# Patient Record
(demographics unavailable — no encounter records)

---

## 2024-10-10 NOTE — IMAGING
[de-identified] : RIGHT HAND skin intact. nodule over IF dorsal ulnar DIPJ. mild swelling of IF ulnar PIPJ. TTP to IF dorsal ulnar DIPJ nodule > ulnar PIPJ. good EPL, FPL. other fingers good extension, flex to full fist. good finger abduction, adduction. SILT to median, ulnar, radial distribution. palpable radial pulse, brisk cap refill all digits. no triggering.   XRAYS OF RIGHT INDEX FINGER (3 views - PA, OBLIQUE, AND LATERAL VIEWS): no acute displaced fracture or dislocation. calcifications ulnar to DIPJ and PIPJ.

## 2024-10-10 NOTE — HISTORY OF PRESENT ILLNESS
[de-identified] : 10/10/24: 56yo female (RHD) presents for RIGHT index finger mass after an injury ~6 months ago. She was lifting a laundry machine, and her finger got jammed underneath the detergent drawer. Currently on antibiotics for oral infection. [FreeTextEntry5] : GENNA olguin [RHD] 55 year old female is here today c/o RIGHT index finger x 6 months after finger was jammed against a drawer. reports bump at tip of finger is increasing in size. denies prior injury to finger.  -on antibiotics for oral infection.

## 2024-10-10 NOTE — IMAGING
[de-identified] : RIGHT HAND skin intact. nodule over IF dorsal ulnar DIPJ. mild swelling of IF ulnar PIPJ. TTP to IF dorsal ulnar DIPJ nodule > ulnar PIPJ. good EPL, FPL. other fingers good extension, flex to full fist. good finger abduction, adduction. SILT to median, ulnar, radial distribution. palpable radial pulse, brisk cap refill all digits. no triggering.   XRAYS OF RIGHT INDEX FINGER (3 views - PA, OBLIQUE, AND LATERAL VIEWS): no acute displaced fracture or dislocation. calcifications ulnar to DIPJ and PIPJ.

## 2024-10-10 NOTE — HISTORY OF PRESENT ILLNESS
[de-identified] : 10/10/24: 56yo female (RHD) presents for RIGHT index finger mass after an injury ~6 months ago. She was lifting a laundry machine, and her finger got jammed underneath the detergent drawer. Currently on antibiotics for oral infection. [FreeTextEntry5] : GENNA olguin [RHD] 55 year old female is here today c/o RIGHT index finger x 6 months after finger was jammed against a drawer. reports bump at tip of finger is increasing in size. denies prior injury to finger.  -on antibiotics for oral infection.

## 2024-10-10 NOTE — ASSESSMENT
[FreeTextEntry1] : The condition was explained to the patient. Calcifications likely due to arthritis, may also be related to injury.  Discussed that arthritis is a progressive degenerative process, and symptoms may have a waxing/waning course, which may be exacerbated by activity or trauma.  Discussed increased propensity for stiffness due to underlying arthritis.  - recommend silicone sleeve for finger PRN pain. - recommend Voltaren gel 3x/day for 1-2 weeks. reviewed contra-indicated medical conditions (eg liver disease, kidney disease, or GI ulcer/bleeding) or medications (eg blood thinners).  - activity as tolerated.   F/u PRN.

## 2025-02-25 NOTE — SIGNATURES
[TextEntry] : This note was written by Nickie Jiang on 02/25/2025 actively solely BLAKE Nunez M.D 02/25/2025. All medical record entries made by this scribe were at my  BLAKE Nunez M.D  direction and personally dictated by me on 02/25/2025. I have personally reviewed my chart and agree that the record reflects my personal performance of the history, physical exam, assessment, and plan.

## 2025-02-25 NOTE — HISTORY OF PRESENT ILLNESS
[FreeTextEntry1] : 56 yo P2 LMP 2019 presents for annual gyn exam.   Pt is doing well and has no complaints. She denies PMB and abnormal discharge. Offers no complaints regarding bowel movement or urination.   [Mammogramdate] : 02/23 [BreastSonogramDate] : 02/23 [PapSmeardate] : 02/23

## 2025-02-25 NOTE — PLAN
[FreeTextEntry1] : 54 yo P2 LMP 2019 presents for annual gyn exam.   Cervical polyp removed during PE today, tissue biopy sent Pap/HPV done today RTO for annual

## 2025-02-25 NOTE — PHYSICAL EXAM
[Chaperone Present] : A chaperone was present in the examining room during all aspects of the physical examination [FreeTextEntry2] : Omid [Appropriately responsive] : appropriately responsive [Alert] : alert [No Acute Distress] : no acute distress [No Lymphadenopathy] : no lymphadenopathy [Soft] : soft [Non-tender] : non-tender [Non-distended] : non-distended [No HSM] : No HSM [No Lesions] : no lesions [No Mass] : no mass [Oriented x3] : oriented x3 [Examination Of The Breasts] : a normal appearance [No Masses] : no breast masses were palpable [Labia Majora] : normal [Labia Minora] : normal [Polyp ___ cm] : [unfilled] ~[facility] polyp [Tissue Removed] : tissue removed [Normal] : normal [Uterine Adnexae] : normal

## 2025-04-28 NOTE — IMAGING
[de-identified] : RIGHT HAND superficial laceration over IF nail fold. nodule over IF dorsal ulnar DIPJ with mild surrounding swelling. no erythema or drainage. no TTP. mild swelling of IF PIPJ. good EPL, FPL. other fingers good extension, flex to full fist. good finger abduction, adduction. SILT to median, ulnar, radial distribution. palpable radial pulse, brisk cap refill all digits. no triggering.  @10/10/24 XRAYS OF RIGHT INDEX FINGER (3 views - PA, OBLIQUE, AND LATERAL VIEWS): no acute displaced fracture or dislocation. calcifications ulnar to DIPJ and PIPJ.

## 2025-04-28 NOTE — HISTORY OF PRESENT ILLNESS
[de-identified] : 4/28/25: f/u RIGHT index finger mass. reports mass fluctuates in size. reports that she often picks at it, which may exacerbate it. not painful, but "sore and annoying." Reports that she tried Voltaren gel without improvement. Reports that she saw a Plastic surgeon, who recommended surgical excision of cyst. Reports that she saw a Hand / Total body surgeon in [place], who recommended aspiration, but she deferred at that time because he is out of network.  10/10/24: 56yo female (RHD) presents for RIGHT index finger mass after an injury ~6 months ago. She was lifting a laundry machine, and her finger got jammed underneath the detergent drawer. Currently on antibiotics for oral infection. [FreeTextEntry5] : GENNA is here today to follow up on her RIGHT index finger. pt states there are no changes in their symptoms since the last visit.

## 2025-04-28 NOTE — ASSESSMENT
[FreeTextEntry1] : Based on today's exam, mass appears to be primarily due to calcifications, which may be due to prior injury or arthritis. There does not appear to be enough fluid for aspiration.  We had an extensive discussion regarding the risks and benefits of treatment options - observation, silicone sleeve for finger, Voltaren gel PRN pain, aspiration if there is sufficient fluid, or surgical excision.  All of patient's questions were answered. Patient expressed understanding. - Patient is not interested in surgery. - Recommend silicone sleeve for finger and Voltaren gel PRN. - activity as tolerated.   F/u PRN.

## 2025-04-28 NOTE — HISTORY OF PRESENT ILLNESS
[de-identified] : 4/28/25: f/u RIGHT index finger mass. reports mass fluctuates in size. reports that she often picks at it, which may exacerbate it. not painful, but "sore and annoying." Reports that she tried Voltaren gel without improvement. Reports that she saw a Plastic surgeon, who recommended surgical excision of cyst. Reports that she saw a Hand / Total body surgeon in [place], who recommended aspiration, but she deferred at that time because he is out of network.  10/10/24: 54yo female (RHD) presents for RIGHT index finger mass after an injury ~6 months ago. She was lifting a laundry machine, and her finger got jammed underneath the detergent drawer. Currently on antibiotics for oral infection. [FreeTextEntry5] : GENNA is here today to follow up on her RIGHT index finger. pt states there are no changes in their symptoms since the last visit.

## 2025-04-28 NOTE — IMAGING
[de-identified] : RIGHT HAND superficial laceration over IF nail fold. nodule over IF dorsal ulnar DIPJ with mild surrounding swelling. no erythema or drainage. no TTP. mild swelling of IF PIPJ. good EPL, FPL. other fingers good extension, flex to full fist. good finger abduction, adduction. SILT to median, ulnar, radial distribution. palpable radial pulse, brisk cap refill all digits. no triggering.  @10/10/24 XRAYS OF RIGHT INDEX FINGER (3 views - PA, OBLIQUE, AND LATERAL VIEWS): no acute displaced fracture or dislocation. calcifications ulnar to DIPJ and PIPJ.